# Patient Record
Sex: MALE | Race: WHITE | URBAN - METROPOLITAN AREA
[De-identification: names, ages, dates, MRNs, and addresses within clinical notes are randomized per-mention and may not be internally consistent; named-entity substitution may affect disease eponyms.]

---

## 2020-04-15 ENCOUNTER — OFFICE VISIT (OUTPATIENT)
Dept: URBAN - METROPOLITAN AREA CLINIC 23 | Facility: CLINIC | Age: 70
End: 2020-04-15
Payer: MEDICARE

## 2020-04-15 DIAGNOSIS — H40.053 OCULAR HYPERTENSION, BILATERAL: Primary | ICD-10-CM

## 2020-04-15 PROCEDURE — 92004 COMPRE OPH EXAM NEW PT 1/>: CPT | Performed by: OPHTHALMOLOGY

## 2020-04-15 PROCEDURE — 92133 CPTRZD OPH DX IMG PST SGM ON: CPT | Performed by: OPHTHALMOLOGY

## 2020-04-15 PROCEDURE — 76514 ECHO EXAM OF EYE THICKNESS: CPT | Performed by: OPHTHALMOLOGY

## 2020-04-15 PROCEDURE — 92020 GONIOSCOPY: CPT | Performed by: OPHTHALMOLOGY

## 2020-04-15 RX ORDER — LATANOPROST 50 UG/ML
0.005 % SOLUTION OPHTHALMIC
Qty: 1 | Refills: 1 | Status: INACTIVE
Start: 2020-04-15 | End: 2020-06-10

## 2020-04-15 ASSESSMENT — INTRAOCULAR PRESSURE
OS: 28
OD: 33

## 2020-04-15 ASSESSMENT — KERATOMETRY
OD: 42.88
OS: 42.25

## 2020-04-15 NOTE — IMPRESSION/PLAN
Impression: Ocular hypertension, bilateral: H40.053. Plan: Pt has Glaucoma    Gonio : cbb 2+ pg ou       Pachs:  560/565   Today's IOP :    33, 28      // Tmax & date :  
Target IOP low to mid teens Pt denies Fhx of Glaucoma Left eye is the better seeing eye  
no vf today C/D:  .15-.15 with PPA / .2-.2 OCT: (04/15/2020) 95, 91 Pt denies Sulfa Allergy   // Pt denies Lung /Heart dx Pt is currently using : no drops No previous medications used Plan :
1. Patient presents as a glaucoma suspect. Based on IOP upon today's exam recommend to begin treatment to avoid any damage in the future from glaucoma. 2. BEGIN Latanoprost qhs ou 3.  Return in 6-8 weeks for IOP check

## 2020-06-10 ENCOUNTER — OFFICE VISIT (OUTPATIENT)
Dept: URBAN - METROPOLITAN AREA CLINIC 23 | Facility: CLINIC | Age: 70
End: 2020-06-10
Payer: MEDICARE

## 2020-06-10 PROCEDURE — 92012 INTRM OPH EXAM EST PATIENT: CPT | Performed by: OPHTHALMOLOGY

## 2020-06-10 RX ORDER — LATANOPROST 50 UG/ML
0.005 % SOLUTION OPHTHALMIC
Qty: 3 | Refills: 3 | Status: INACTIVE
Start: 2020-06-10 | End: 2021-09-15

## 2020-06-10 ASSESSMENT — INTRAOCULAR PRESSURE
OD: 16
OS: 15

## 2020-06-10 NOTE — IMPRESSION/PLAN
Impression: Ocular hypertension, bilateral: H40.053. Plan: Pt has Glaucoma    Gonio : cbb 2+ pg ou       Pachs:  560/565   Today's IOP :16/15   Tmax & date : 37/28 Target IOP low to mid teens Pt denies Fhx of Glaucoma Left eye is the better seeing eye  
no vf today C/D:  .15-.15 with PPA / .2-.2 OCT: (04/15/2020) 95, 91 Pt denies Sulfa Allergy   // Pt denies Lung /Heart dx Pt is currently using : no drops No previous medications used Plan :
1. Patient presents as a glaucoma suspect. Based on IOP upon today's exam recommend to begin treatment to avoid any damage in the future from glaucoma. 2. BEGIN Latanoprost qhs ou 3.  Return in 6-8 weeks for IOP check

## 2020-10-13 ENCOUNTER — OFFICE VISIT (OUTPATIENT)
Dept: URBAN - METROPOLITAN AREA CLINIC 23 | Facility: CLINIC | Age: 70
End: 2020-10-13
Payer: MEDICARE

## 2020-10-13 PROCEDURE — 92014 COMPRE OPH EXAM EST PT 1/>: CPT | Performed by: OPHTHALMOLOGY

## 2020-10-13 ASSESSMENT — INTRAOCULAR PRESSURE
OD: 19
OS: 17

## 2020-10-13 NOTE — ASSESSMENT/PLAN
Impression: Visual Field - OD: Good-enlarged blind spot; OS: Good-full Plan: See Imp/Plan for further information

## 2020-10-13 NOTE — IMPRESSION/PLAN
Impression: Ocular hypertension, bilateral: H40.053. Plan: Pt has Glaucoma    Gonio : cbb 2+ pg ou       Pachs:  560/565   Today's IOP :16/15   Tmax & date : 37/28 Target IOP low to mid teens Pt denies Fhx of Glaucoma Left eye is the better seeing eye  
no vf today C/D:  .15-.15 with PPA / .2-.2 OCT: (04/15/2020) 95, 91 Pt denies Sulfa Allergy   // Pt denies Lung /Heart dx Pt is currently using : Latanoprost qhs ou No previous medications used Plan :
1. Cont:
Latanoprost qhs ou 
2. IOP and condition appear stable today. No changes being made to current regimen. Recommend monitoring condition at this time. Continue all medications as directed.  
3. Return in 6 months for IOP check

## 2021-04-14 ENCOUNTER — OFFICE VISIT (OUTPATIENT)
Dept: URBAN - METROPOLITAN AREA CLINIC 23 | Facility: CLINIC | Age: 71
End: 2021-04-14
Payer: MEDICARE

## 2021-04-14 PROCEDURE — 99213 OFFICE O/P EST LOW 20 MIN: CPT | Performed by: OPHTHALMOLOGY

## 2021-04-14 NOTE — IMPRESSION/PLAN
Impression: Ocular hypertension, bilateral: H40.053. Plan: Pt has Glaucoma    Gonio : cbb 2+ pg ou       Pachs:  560/565   Today's IOP :21/14   Tmax & date : 37/28 Target IOP low to mid teens Pt denies Fhx of Glaucoma Left eye is the better seeing eye VF: OU Full 10/12/2020 C/D:  .15-.15 with PPA / .2-.2 OCT: (04/15/2020) 95, 91 (4/14/21 66/103 poor immaging, Will repeat) Pt denies Sulfa Allergy   // Pt denies Lung /Heart dx Plan :
1. Cont:
Latanoprost qhs ou 
2. IOP and condition appear stable today. No changes being made to current regimen. Recommend monitoring condition at this time. Continue all medications as directed.  
3. 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

## 2021-08-03 ENCOUNTER — OFFICE VISIT (OUTPATIENT)
Dept: URBAN - METROPOLITAN AREA CLINIC 29 | Facility: CLINIC | Age: 71
End: 2021-08-03
Payer: MEDICARE

## 2021-08-03 PROCEDURE — 92083 EXTENDED VISUAL FIELD XM: CPT | Performed by: OPHTHALMOLOGY

## 2021-08-03 PROCEDURE — 92133 CPTRZD OPH DX IMG PST SGM ON: CPT | Performed by: OPHTHALMOLOGY

## 2021-08-03 PROCEDURE — 99213 OFFICE O/P EST LOW 20 MIN: CPT | Performed by: OPHTHALMOLOGY

## 2021-08-03 ASSESSMENT — INTRAOCULAR PRESSURE
OD: 18
OS: 14

## 2021-08-03 NOTE — IMPRESSION/PLAN
Impression: Ocular hypertension, bilateral: H40.053. Plan: Pt has Glaucoma    Gonio : cbb 2+ pg ou       Pachs:  560/565   Today's IOP :18/14   Tmax  : 37/28 Target IOP low to mid teens Pt denies Fhx of Glaucoma Left eye is the better seeing eye VF: OD nasal step OS Full 8/3/21 C/D:  .15-.15 with PPA / .2-.2 OCT: (04/15/2020) 95, 91 (4/14/21 66/103 poor immaging, Will repeat) Pt denies Sulfa Allergy   // Pt denies Lung /Heart dx Plan :
1. Cont:
Latanoprost qhs ou 
2. IOP and condition appear stable today. No changes being made to current regimen. Recommend monitoring condition at this time. Continue all medications as directed.  
3. Return in 6 months for IOP check & VF OD ONLY

## 2022-02-08 ENCOUNTER — OFFICE VISIT (OUTPATIENT)
Dept: URBAN - METROPOLITAN AREA CLINIC 28 | Facility: CLINIC | Age: 72
End: 2022-02-08
Payer: MEDICARE

## 2022-02-08 PROCEDURE — 99213 OFFICE O/P EST LOW 20 MIN: CPT | Performed by: OPHTHALMOLOGY

## 2022-02-08 PROCEDURE — 92083 EXTENDED VISUAL FIELD XM: CPT | Performed by: OPHTHALMOLOGY

## 2022-02-08 ASSESSMENT — INTRAOCULAR PRESSURE
OS: 15
OD: 16

## 2022-02-08 NOTE — IMPRESSION/PLAN
Impression: Ocular hypertension, bilateral: H40.053. Plan: Pt has Glaucoma    Gonio : cbb 2+ pg ou       Pachs:  560/565   Today's IOP :16/15   Tmax  : 37/28 Target IOP low to mid teens Pt denies Fhx of Glaucoma Left eye is the better seeing eye VF: OD nasal step OS Full 02/08/22 C/D:  .15-.15 with PPA / .2-.2 OCT: (04/15/2020) 95, 91 (4/14/21 66/103 poor immaging, Will repeat) Pt denies Sulfa Allergy // Pt denies Lung /Heart dx Plan :
1. Continue:
Latanoprost qhs ou 
2. IOP and condition appear stable today. No changes being made to current regimen. Recommend monitoring condition at this time. Continue all medications as directed.  
3. 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 Rios Rodriguez

## 2022-08-02 ENCOUNTER — OFFICE VISIT (OUTPATIENT)
Dept: URBAN - METROPOLITAN AREA CLINIC 28 | Facility: CLINIC | Age: 72
End: 2022-08-02
Payer: MEDICARE

## 2022-08-02 DIAGNOSIS — H40.053 OCULAR HYPERTENSION, BILATERAL: Primary | ICD-10-CM

## 2022-08-02 PROCEDURE — 99213 OFFICE O/P EST LOW 20 MIN: CPT | Performed by: OPHTHALMOLOGY

## 2022-08-02 ASSESSMENT — INTRAOCULAR PRESSURE
OS: 15
OD: 19

## 2022-08-02 NOTE — IMPRESSION/PLAN
Impression: Ocular hypertension, bilateral: H40.053. Plan: Pt has Glaucoma    Gonio : cbb 2+ pg ou       Pachs:  560/565   Today's IOP :19/15   Tmax  : 37/28 Target IOP low to mid teens Pt denies Fhx of Glaucoma Left eye is the better seeing eye VF: OD nasal step OS Full 02/08/22 C/D:  .15-.15 with PPA / .2-.2 OCT: (04/15/2020) 95, 91 (4/14/21 66/103 poor immaging, Will repeat) Pt denies Sulfa Allergy // Pt denies Lung /Heart dx Plan :
1. Continue:
Latanoprost qhs ou 
2. IOP and condition appear stable today. No changes being made to current regimen. Recommend monitoring condition at this time. Continue all medications as directed. 3.  Patient to refer to optometrist for medical management of Glaucoma. Patient to return to Dr. Scott Bills if changes in visual field or OCT.

## 2023-02-02 ENCOUNTER — OFFICE VISIT (OUTPATIENT)
Dept: URBAN - METROPOLITAN AREA CLINIC 28 | Facility: CLINIC | Age: 73
End: 2023-02-02
Payer: MEDICARE

## 2023-02-02 DIAGNOSIS — H40.053 OCULAR HYPERTENSION, BILATERAL: Primary | ICD-10-CM

## 2023-02-02 PROCEDURE — 92133 CPTRZD OPH DX IMG PST SGM ON: CPT | Performed by: OPTOMETRIST

## 2023-02-02 PROCEDURE — 92004 COMPRE OPH EXAM NEW PT 1/>: CPT | Performed by: OPTOMETRIST

## 2023-02-02 RX ORDER — LATANOPROST 50 UG/ML
0.005 % SOLUTION OPHTHALMIC
Qty: 7.5 | Refills: 3 | Status: ACTIVE
Start: 2023-02-02

## 2023-02-02 ASSESSMENT — INTRAOCULAR PRESSURE
OS: 15
OS: 16
OD: 18

## 2023-02-02 ASSESSMENT — KERATOMETRY
OS: 42.50
OD: 42.88

## 2023-02-02 NOTE — IMPRESSION/PLAN
Impression: Ocular hypertension, bilateral: H40.053. Plan: Educated on exam findings and glaucoma. Gonio: CBB 2+ pg OU    Pachs: 560/565   Today's IOP: 18/16  Tmax: 37/28 Target IOP mid teens Pt denies Fhx of Glaucoma Left eye is the better seeing eye VF: 02/08/22 OD: nasal step, OS: Full C/D: OD: 0.20x0.20 with PPA, OS: 0.30x0.30
OCT: 02/02/23 OD: 82 poor scan 2/2 PPA, OS: 108 Pt denies Sulfa Allergy // Pt denies lung/heart dx Plan :
1. Continue:
Latanoprost QHS OU 
2. IOP and condition appear stable today. No changes being made to current regimen. Recommend monitoring condition at this time. Continue all medications as directed. 3.  Patient to continue with medical management of glaucoma. Patient to return to Dr. Pepe Mane if changes in glaucoma status.

## 2023-08-04 ENCOUNTER — OFFICE VISIT (OUTPATIENT)
Dept: URBAN - METROPOLITAN AREA CLINIC 28 | Facility: CLINIC | Age: 73
End: 2023-08-04
Payer: MEDICARE

## 2023-08-04 DIAGNOSIS — H40.053 OCULAR HYPERTENSION, BILATERAL: Primary | ICD-10-CM

## 2023-08-04 PROCEDURE — 92014 COMPRE OPH EXAM EST PT 1/>: CPT | Performed by: OPTOMETRIST

## 2023-08-04 PROCEDURE — 92083 EXTENDED VISUAL FIELD XM: CPT | Performed by: OPTOMETRIST

## 2023-08-04 ASSESSMENT — INTRAOCULAR PRESSURE
OD: 18
OS: 17

## 2024-02-09 ENCOUNTER — OFFICE VISIT (OUTPATIENT)
Dept: URBAN - METROPOLITAN AREA CLINIC 28 | Facility: CLINIC | Age: 74
End: 2024-02-09
Payer: MEDICARE

## 2024-02-09 DIAGNOSIS — H40.053 OCULAR HYPERTENSION, BILATERAL: Primary | ICD-10-CM

## 2024-02-09 PROCEDURE — 92133 CPTRZD OPH DX IMG PST SGM ON: CPT | Performed by: OPTOMETRIST

## 2024-02-09 PROCEDURE — 92014 COMPRE OPH EXAM EST PT 1/>: CPT | Performed by: OPTOMETRIST

## 2024-02-09 PROCEDURE — 92083 EXTENDED VISUAL FIELD XM: CPT | Performed by: OPTOMETRIST

## 2024-02-09 ASSESSMENT — KERATOMETRY
OD: 43.13
OS: 42.38

## 2024-02-09 ASSESSMENT — INTRAOCULAR PRESSURE
OS: 15
OD: 16

## 2024-09-24 ENCOUNTER — OFFICE VISIT (OUTPATIENT)
Dept: URBAN - METROPOLITAN AREA CLINIC 28 | Facility: CLINIC | Age: 74
End: 2024-09-24
Payer: MEDICARE

## 2024-09-24 DIAGNOSIS — H40.053 OCULAR HYPERTENSION, BILATERAL: Primary | ICD-10-CM

## 2024-09-24 PROCEDURE — 92014 COMPRE OPH EXAM EST PT 1/>: CPT | Performed by: OPTOMETRIST

## 2024-09-24 PROCEDURE — 92133 CPTRZD OPH DX IMG PST SGM ON: CPT | Performed by: OPTOMETRIST

## 2024-09-24 ASSESSMENT — INTRAOCULAR PRESSURE
OD: 17
OS: 17

## 2025-03-25 ENCOUNTER — OFFICE VISIT (OUTPATIENT)
Dept: URBAN - METROPOLITAN AREA CLINIC 28 | Facility: CLINIC | Age: 75
End: 2025-03-25
Payer: MEDICARE

## 2025-03-25 DIAGNOSIS — H40.053 OCULAR HYPERTENSION, BILATERAL: Primary | ICD-10-CM

## 2025-03-25 PROCEDURE — 99213 OFFICE O/P EST LOW 20 MIN: CPT | Performed by: OPTOMETRIST

## 2025-03-25 PROCEDURE — 92083 EXTENDED VISUAL FIELD XM: CPT | Performed by: OPTOMETRIST

## 2025-03-25 RX ORDER — LATANOPROST 50 UG/ML
0.005 % SOLUTION OPHTHALMIC
Qty: 7.5 | Refills: 3 | Status: ACTIVE
Start: 2025-03-25

## 2025-03-25 ASSESSMENT — INTRAOCULAR PRESSURE
OS: 16
OD: 17